# Patient Record
Sex: FEMALE | Race: WHITE | NOT HISPANIC OR LATINO | Employment: UNEMPLOYED | URBAN - NONMETROPOLITAN AREA
[De-identification: names, ages, dates, MRNs, and addresses within clinical notes are randomized per-mention and may not be internally consistent; named-entity substitution may affect disease eponyms.]

---

## 2017-07-12 ENCOUNTER — HOSPITAL ENCOUNTER (EMERGENCY)
Facility: HOSPITAL | Age: 22
Discharge: HOME | End: 2017-07-12
Attending: NURSE PRACTITIONER | Admitting: NURSE PRACTITIONER
Payer: COMMERCIAL

## 2017-07-12 VITALS
SYSTOLIC BLOOD PRESSURE: 120 MMHG | OXYGEN SATURATION: 100 % | RESPIRATION RATE: 16 BRPM | DIASTOLIC BLOOD PRESSURE: 74 MMHG | TEMPERATURE: 98.3 F | HEART RATE: 90 BPM

## 2017-07-12 DIAGNOSIS — R42 DIZZINESS: ICD-10-CM

## 2017-07-12 LAB
ALBUMIN SERPL-MCNC: 4.1 G/DL (ref 3.4–5)
ALBUMIN UR-MCNC: NEGATIVE MG/DL
ALP SERPL-CCNC: 69 U/L (ref 40–150)
ALT SERPL W P-5'-P-CCNC: 21 U/L (ref 0–50)
ANION GAP SERPL CALCULATED.3IONS-SCNC: 4 MMOL/L (ref 3–14)
APPEARANCE UR: CLEAR
AST SERPL W P-5'-P-CCNC: 12 U/L (ref 0–45)
BACTERIA #/AREA URNS HPF: ABNORMAL /HPF
BASOPHILS # BLD AUTO: 0.1 10E9/L (ref 0–0.2)
BASOPHILS NFR BLD AUTO: 1 %
BILIRUB SERPL-MCNC: 0.4 MG/DL (ref 0.2–1.3)
BILIRUB UR QL STRIP: NEGATIVE
BUN SERPL-MCNC: 14 MG/DL (ref 7–30)
CALCIUM SERPL-MCNC: 8.8 MG/DL (ref 8.5–10.1)
CHLORIDE SERPL-SCNC: 106 MMOL/L (ref 94–109)
CO2 SERPL-SCNC: 28 MMOL/L (ref 20–32)
COLOR UR AUTO: YELLOW
CREAT SERPL-MCNC: 0.57 MG/DL (ref 0.52–1.04)
DIFFERENTIAL METHOD BLD: NORMAL
EOSINOPHIL # BLD AUTO: 0.1 10E9/L (ref 0–0.7)
EOSINOPHIL NFR BLD AUTO: 1.2 %
ERYTHROCYTE [DISTWIDTH] IN BLOOD BY AUTOMATED COUNT: 12.6 % (ref 10–15)
GFR SERPL CREATININE-BSD FRML MDRD: NORMAL ML/MIN/1.7M2
GLUCOSE SERPL-MCNC: 95 MG/DL (ref 70–99)
GLUCOSE UR STRIP-MCNC: NEGATIVE MG/DL
HCG UR QL: NEGATIVE
HCT VFR BLD AUTO: 37.8 % (ref 35–47)
HGB BLD-MCNC: 13.3 G/DL (ref 11.7–15.7)
HGB UR QL STRIP: NEGATIVE
IMM GRANULOCYTES # BLD: 0 10E9/L (ref 0–0.4)
IMM GRANULOCYTES NFR BLD: 0.2 %
KETONES UR STRIP-MCNC: NEGATIVE MG/DL
LEUKOCYTE ESTERASE UR QL STRIP: ABNORMAL
LYMPHOCYTES # BLD AUTO: 1.2 10E9/L (ref 0.8–5.3)
LYMPHOCYTES NFR BLD AUTO: 24.6 %
MCH RBC QN AUTO: 30.7 PG (ref 26.5–33)
MCHC RBC AUTO-ENTMCNC: 35.2 G/DL (ref 31.5–36.5)
MCV RBC AUTO: 87 FL (ref 78–100)
MONOCYTES # BLD AUTO: 0.6 10E9/L (ref 0–1.3)
MONOCYTES NFR BLD AUTO: 12.2 %
NEUTROPHILS # BLD AUTO: 2.9 10E9/L (ref 1.6–8.3)
NEUTROPHILS NFR BLD AUTO: 60.8 %
NITRATE UR QL: NEGATIVE
NRBC # BLD AUTO: 0 10*3/UL
NRBC BLD AUTO-RTO: 0 /100
PH UR STRIP: 7 PH (ref 4.7–8)
PLATELET # BLD AUTO: 209 10E9/L (ref 150–450)
POTASSIUM SERPL-SCNC: 3.9 MMOL/L (ref 3.4–5.3)
PROT SERPL-MCNC: 7.7 G/DL (ref 6.8–8.8)
RBC # BLD AUTO: 4.33 10E12/L (ref 3.8–5.2)
RBC #/AREA URNS AUTO: 0 /HPF (ref 0–2)
SODIUM SERPL-SCNC: 138 MMOL/L (ref 133–144)
SP GR UR STRIP: 1.01 (ref 1–1.03)
SQUAMOUS #/AREA URNS AUTO: 7 /HPF (ref 0–1)
URN SPEC COLLECT METH UR: ABNORMAL
UROBILINOGEN UR STRIP-MCNC: NORMAL MG/DL (ref 0–2)
WBC # BLD AUTO: 4.8 10E9/L (ref 4–11)
WBC #/AREA URNS AUTO: 1 /HPF (ref 0–2)

## 2017-07-12 PROCEDURE — 99214 OFFICE O/P EST MOD 30 MIN: CPT | Performed by: NURSE PRACTITIONER

## 2017-07-12 PROCEDURE — 99213 OFFICE O/P EST LOW 20 MIN: CPT

## 2017-07-12 PROCEDURE — 81025 URINE PREGNANCY TEST: CPT | Performed by: NURSE PRACTITIONER

## 2017-07-12 PROCEDURE — 81001 URINALYSIS AUTO W/SCOPE: CPT | Performed by: NURSE PRACTITIONER

## 2017-07-12 PROCEDURE — 36415 COLL VENOUS BLD VENIPUNCTURE: CPT | Performed by: NURSE PRACTITIONER

## 2017-07-12 PROCEDURE — 85025 COMPLETE CBC W/AUTO DIFF WBC: CPT | Performed by: NURSE PRACTITIONER

## 2017-07-12 PROCEDURE — 80053 COMPREHEN METABOLIC PANEL: CPT | Performed by: NURSE PRACTITIONER

## 2017-07-12 RX ORDER — ONDANSETRON 4 MG/1
4 TABLET, ORALLY DISINTEGRATING ORAL EVERY 8 HOURS PRN
Qty: 10 TABLET | Refills: 0 | Status: SHIPPED | OUTPATIENT
Start: 2017-07-12 | End: 2017-07-15

## 2017-07-12 RX ORDER — MECLIZINE HCL 12.5 MG 12.5 MG/1
12.5 TABLET ORAL 4 TIMES DAILY PRN
Qty: 12 TABLET | Refills: 0 | Status: SHIPPED | OUTPATIENT
Start: 2017-07-12 | End: 2017-07-15

## 2017-07-12 ASSESSMENT — ENCOUNTER SYMPTOMS
HEADACHES: 0
FATIGUE: 1
DIZZINESS: 1
MUSCULOSKELETAL NEGATIVE: 1
APPETITE CHANGE: 0
NAUSEA: 1
COUGH: 0
LIGHT-HEADEDNESS: 0
FEVER: 0
ABDOMINAL PAIN: 0
WEAKNESS: 0

## 2017-07-12 NOTE — DISCHARGE INSTRUCTIONS
You're lab work was all normal.   No indication of infection, no electrolyte imbalances, no UTI, no pregnancy was found.     See Up-to-date hand out.   Rest and push fluids.   Use medications as needed.   Follow up with PCP if not improving in 2-3 days.

## 2017-07-12 NOTE — ED PROVIDER NOTES
"  History     Chief Complaint   Patient presents with     Dizziness     started having dizziness and unbalanced feeling starting during the night.     The history is provided by the patient. No  was used.     Luisa Varela is a 22 year old female who presents with dizziness that started this morning. Feels like she can fall over if she moves too fast. No cough, runny nose, fever. Had nausea initially, no vomiting. Felt a bit better after eating, then symptoms returned a short time later. At rest she is fine, movement make symptoms worse. Reports her ears were \"goopy\" this morning. No urinary symptoms, no diarrhea/constipation. No injury. No vision or hearing changes. Does feel more fatigued but this is normal for her.  Reports her symptoms as a woozy feeling, no room spinning.     I have reviewed the Medications, Allergies, Past Medical and Surgical History, and Social History in the Epic system.    Allergies:   Allergies   Allergen Reactions     No Clinical Screening - See Comments Itching     bleach         No current facility-administered medications on file prior to encounter.   No current outpatient prescriptions on file prior to encounter.    There is no problem list on file for this patient.      Past Surgical History:   Procedure Laterality Date     wisdom teeth extracted         Social History   Substance Use Topics     Smoking status: Former Smoker     Packs/day: 0.30     Years: 0.60     Types: Cigarettes     Smokeless tobacco: Not on file      Comment: quit in 2011 - no passive smoke exposure     Alcohol use No         There is no immunization history on file for this patient.    BMI: There is no height or weight on file to calculate BMI.      Review of Systems   Constitutional: Positive for fatigue. Negative for appetite change and fever.   HENT: Negative.    Respiratory: Negative for cough.    Gastrointestinal: Positive for nausea. Negative for abdominal pain.   Genitourinary: " Negative.    Musculoskeletal: Negative.    Neurological: Positive for dizziness. Negative for weakness, light-headedness and headaches.       Physical Exam   BP: 120/74  Pulse: 90  Temp: 98.3  F (36.8  C)  Resp: 16  SpO2: 100 %  Physical Exam   Constitutional: She is oriented to person, place, and time. Vital signs are normal. She appears well-developed and well-nourished. She is active and cooperative. She does not appear ill. No distress.   HENT:   Head: Normocephalic and atraumatic.   Right Ear: Tympanic membrane and external ear normal.   Left Ear: Tympanic membrane and external ear normal.   Nose: Nose normal.   Mouth/Throat: Uvula is midline, oropharynx is clear and moist and mucous membranes are normal. No oropharyngeal exudate.   Eyes: Conjunctivae, EOM and lids are normal. Pupils are equal, round, and reactive to light. No scleral icterus.   Neck: Normal range of motion. Neck supple. No rigidity. No edema, no erythema and normal range of motion present.   Cardiovascular: Normal rate, regular rhythm and normal heart sounds.    No murmur heard.  Pulmonary/Chest: Effort normal and breath sounds normal. No respiratory distress. She has no wheezes.   Abdominal: Soft. Normal appearance and bowel sounds are normal. She exhibits no distension. There is no hepatosplenomegaly. There is no tenderness. There is no CVA tenderness.   Musculoskeletal: Normal range of motion.   Lymphadenopathy:        Head (right side): No submental, no submandibular, no tonsillar, no preauricular, no posterior auricular and no occipital adenopathy present.        Head (left side): No submental, no submandibular, no tonsillar, no preauricular, no posterior auricular and no occipital adenopathy present.     She has no cervical adenopathy.   Neurological: She is alert and oriented to person, place, and time. She displays no atrophy and no tremor. No cranial nerve deficit or sensory deficit. She exhibits normal muscle tone. She displays no  seizure activity. Coordination and gait normal. GCS eye subscore is 4. GCS verbal subscore is 5. GCS motor subscore is 6.   Skin: Skin is warm, dry and intact. No rash noted. She is not diaphoretic.   Psychiatric: She has a normal mood and affect. Her speech is normal and behavior is normal. Judgment and thought content normal. Cognition and memory are normal.   Nursing note and vitals reviewed.      ED Course     ED Course     Procedures          Labs Ordered and Resulted from Time of ED Arrival Up to the Time of Departure from the ED   ROUTINE UA WITH MICROSCOPIC REFLEX TO CULTURE - Abnormal; Notable for the following:        Result Value    Leukocyte Esterase Urine Small (*)     Bacteria Urine None (*)     Squamous Epithelial /HPF Urine 7 (*)     All other components within normal limits   COMPREHENSIVE METABOLIC PANEL   CBC WITH PLATELETS DIFFERENTIAL   HCG QUALITATIVE URINE       Assessments & Plan (with Medical Decision Making)     I have reviewed the nursing notes.    I have reviewed the findings, diagnosis, plan and need for follow up with the patient.  Advised patient:   You're lab work was all normal.   No indication of infection, no electrolyte imbalances, no UTI, no pregnancy was found.   See Up-to-date hand out.   Rest and push fluids.   Use medications as needed.   Follow up with PCP if not improving in 2-3 days.     Discharge Medication List as of 7/12/2017 11:58 AM      START taking these medications    Details   ondansetron (ZOFRAN ODT) 4 MG ODT tab Take 1 tablet (4 mg) by mouth every 8 hours as needed for nausea, Disp-10 tablet, R-0, E-Prescribe      meclizine (ANTIVERT) 12.5 MG tablet Take 1 tablet (12.5 mg) by mouth 4 times daily as needed for dizziness, Disp-12 tablet, R-0, E-Prescribe             Final diagnoses:   Dizziness       7/12/2017   [facility]     Angelica Castaneda NP  07/12/17 7022

## 2017-07-12 NOTE — ED AVS SNAPSHOT
[facility]: [phone]                                              INTERAGENCY TRANSFER FORM - PHYSICIAN ORDERS   2017                    Hospital Admission Date: 2017  SONIA RIVAS   : 1995  Sex: Female        Attending Provider: Angelica Castaneda NP     Allergies:  No Clinical Screening - See Comments    Infection:  None   Service:  URGENT CARE    Ht:  --   Wt:  --   Admission Wt:  --    BMI:  --   BSA:  --            Patient PCP Information     Provider PCP Type    Alondra Almanza MD General      ED Clinical Impression     Diagnosis Description Comment Added By Time Added    Dizziness [R42] Dizziness [R42]  Angelica Castaneda NP 2017 11:55 AM      Hospital Problems as of 2017     None      Non-Hospital Problems as of 2017     None      Code Status History     This patient does not have a recorded code status. Please follow your organizational policy for patients in this situation.         Medication Review      UNREVIEWED medications. Ask your doctor about these medications        Dose / Directions Comments    IBUPROFEN PO        Dose:  400 mg   Take 400 mg by mouth every 6 hours as needed for moderate pain   Refills:  0          START taking        Dose / Directions Comments    meclizine 12.5 MG tablet   Commonly known as:  ANTIVERT        Dose:  12.5 mg   Take 1 tablet (12.5 mg) by mouth 4 times daily as needed for dizziness   Quantity:  12 tablet   Refills:  0        ondansetron 4 MG ODT tab   Commonly known as:  ZOFRAN ODT        Dose:  4 mg   Take 1 tablet (4 mg) by mouth every 8 hours as needed for nausea   Quantity:  10 tablet   Refills:  0                  Further instructions from your care team       You're lab work was all normal.   No indication of infection, no electrolyte imbalances, no UTI, no pregnancy was found.     See Up-to-date hand out.   Rest and push fluids.   Use medications as needed.   Follow up with PCP if not improving in  2-3 days.

## 2017-07-12 NOTE — ED NOTES
During the night pt woke up, noted feeling  dizzy. This am felt dizzy she was unable to stand up for a couple seconds.. Ate breakfast with a little improvement in sx. Denies fever, room did not spin. Denies vomiting and diarrhea.

## 2017-07-12 NOTE — ED AVS SNAPSHOT
[facility]    [address]    [place] [address]    Phone:  [phone]                                       Luisa Varela   MRN: [id number]    Department:  [facility]   Date of Visit:  7/12/2017           Patient Information     Date Of Birth          1995        Your diagnoses for this visit were:     Dizziness        You were seen by Angelica Castaneda NP.      Follow-up Information     Follow up with Alondra Almanza MD In 3 days.    Specialty:  OB/Gyn    Why:  if not improving    Contact information:    [facility]  [address] [place] [id number] [phone]          Discharge Instructions       You're lab work was all normal.   No indication of infection, no electrolyte imbalances, no UTI, no pregnancy was found.     See Up-to-date hand out.   Rest and push fluids.   Use medications as needed.   Follow up with PCP if not improving in 2-3 days.              Review of your medicines      START taking        Dose / Directions Last dose taken    meclizine 12.5 MG tablet   Commonly known as:  ANTIVERT   Dose:  12.5 mg   Quantity:  12 tablet        Take 1 tablet (12.5 mg) by mouth 4 times daily as needed for dizziness   Refills:  0        ondansetron 4 MG ODT tab   Commonly known as:  ZOFRAN ODT   Dose:  4 mg   Quantity:  10 tablet        Take 1 tablet (4 mg) by mouth every 8 hours as needed for nausea   Refills:  0          Our records show that you are taking the medicines listed below. If these are incorrect, please call your family doctor or clinic.        Dose / Directions Last dose taken    IBUPROFEN PO   Dose:  400 mg        Take 400 mg by mouth every 6 hours as needed for moderate pain   Refills:  0                Prescriptions were sent or printed at these locations (2 Prescriptions)                   [facility] [address] [place] [address] [address]    Telephone:  [phone]   Fax:   "[phone]   Hours:                  E-Prescribed (2 of 2)         ondansetron (ZOFRAN ODT) 4 MG ODT tab               meclizine (ANTIVERT) 12.5 MG tablet                Procedures and tests performed during your visit     CBC with platelets differential    Comprehensive metabolic panel    HCG qualitative urine    UA with Microscopic reflex to Culture      Orders Needing Specimen Collection     None      Pending Results     No orders found from 7/10/2017 to 2017.            Pending Culture Results     No orders found from 7/10/2017 to 2017.            Thank you for choosing [facility]       Thank you for choosing [facility] for your care. Our goal is always to provide you with excellent care. Hearing back from our patients is one way we can continue to improve our services. Please take a few minutes to complete the written survey that you may receive in the mail after you visit with us. Thank you!        [employer] Information     "[employer] lets you send messages to your doctor, view your test results, renew your prescriptions, schedule appointments and more. To sign up, go to [email or web address] . Click on \"Log in\" on the left side of the screen, which will take you to the Welcome page. Then click on \"Sign up Now\" on the right side of the page.     You will be asked to enter the access code listed below, as well as some personal information. Please follow the directions to create your username and password.     Your access code is: [email or web address]  Expires: 10/10/2017 11:57 AM     Your access code will  in 90 days. If you need help or a new code, please call your [facility] clinic or [phone].        Care EveryWhere ID     This is your Care EveryWhere ID. This could be used by other organizations to access your [facility] medical records  [id number]        Equal Access to Services     LISBETH SANCHEZ AH: Melanie Cohen, kathrine contreras, kayode cao " abby wilson ah. So [facility] [phone].    ATENCIÓN: Si habla español, tiene a villalobos disposición servicios gratuitos de asistencia lingüística. Llame al [phone].    We comply with applicable federal civil rights laws and [place] laws. We do not discriminate on the basis of race, color, national origin, age, disability sex, sexual orientation or gender identity.            After Visit Summary       This is your record. Keep this with you and show to your community pharmacist(s) and doctor(s) at your next visit.

## 2017-07-12 NOTE — ED NOTES
Patient presents with dizziness started at 5 AM this morning.  NKI .  Patient also stated drinking more water than normal.

## 2017-07-12 NOTE — ED AVS SNAPSHOT
[facility]    [address] [address]    Phone:  [phone]                                       Luisa Varela   MRN: [id number]    Department:  [facility]   Date of Visit:  7/12/2017           After Visit Summary Signature Page     I have received my discharge instructions, and my questions have been answered. I have discussed any challenges I see with this plan with the nurse or doctor.    ..........................................................................................................................................  Patient/Patient Representative Signature      ..........................................................................................................................................  Patient Representative Print Name and Relationship to Patient    ..................................................               ................................................  Date                                            Time    ..........................................................................................................................................  Reviewed by Signature/Title    ...................................................              ..............................................  Date                                                            Time

## 2023-05-27 ENCOUNTER — OFFICE VISIT (OUTPATIENT)
Dept: FAMILY MEDICINE | Facility: OTHER | Age: 28
End: 2023-05-27
Payer: COMMERCIAL

## 2023-05-27 VITALS
SYSTOLIC BLOOD PRESSURE: 138 MMHG | HEART RATE: 75 BPM | TEMPERATURE: 98.3 F | RESPIRATION RATE: 16 BRPM | HEIGHT: 63 IN | BODY MASS INDEX: 31.57 KG/M2 | DIASTOLIC BLOOD PRESSURE: 86 MMHG | WEIGHT: 178.2 LBS | OXYGEN SATURATION: 97 %

## 2023-05-27 DIAGNOSIS — H10.31 ACUTE BACTERIAL CONJUNCTIVITIS OF RIGHT EYE: ICD-10-CM

## 2023-05-27 DIAGNOSIS — J01.00 ACUTE NON-RECURRENT MAXILLARY SINUSITIS: Primary | ICD-10-CM

## 2023-05-27 PROCEDURE — 99203 OFFICE O/P NEW LOW 30 MIN: CPT

## 2023-05-27 RX ORDER — CETIRIZINE HYDROCHLORIDE 10 MG/1
10 TABLET ORAL DAILY
COMMUNITY

## 2023-05-27 RX ORDER — POLYMYXIN B SULFATE AND TRIMETHOPRIM 1; 10000 MG/ML; [USP'U]/ML
SOLUTION OPHTHALMIC
Qty: 10 ML | Refills: 0 | Status: SHIPPED | OUTPATIENT
Start: 2023-05-27 | End: 2023-06-03

## 2023-05-27 RX ORDER — DIPHENHYDRAMINE HCL 12.5 MG/5ML
SOLUTION ORAL 4 TIMES DAILY PRN
COMMUNITY

## 2023-05-27 RX ORDER — DOXYCYCLINE 100 MG/1
100 CAPSULE ORAL 2 TIMES DAILY
Qty: 14 CAPSULE | Refills: 0 | Status: SHIPPED | OUTPATIENT
Start: 2023-05-27 | End: 2023-06-03

## 2023-05-27 RX ORDER — SEMAGLUTIDE 0.25 MG/.5ML
INJECTION, SOLUTION SUBCUTANEOUS
COMMUNITY
Start: 2023-03-06

## 2023-05-27 ASSESSMENT — PAIN SCALES - GENERAL: PAINLEVEL: MILD PAIN (2)

## 2023-05-27 NOTE — PATIENT INSTRUCTIONS
Please refer to your AVS for follow up and pain/symptoms management recommendations (I.e.: medications, helpful conservative treatment modalities, appropriate follow up if need to a specialist or family practice, etc.). Please return to urgent care if your symptoms change or worsen.     Discharge instructions:  -If you were prescribed a medication(s), please take this as prescribed/directed  -Monitor your symptoms, if changing/worsening, return to UC/ER or PCP for follow up    Sinus Infection:   1. Dry out congestion with flonase (1spray in both nostrils 2x daily for 3-5 days) and pseudoephedrine (1-2 tabs every 4-6 hrs for 3-5 days) unless contraindicated     2. Antihistamine such as Claritin or Zyrtec, etc. Generic brands are OK as well.     3. Use a saline spray/Neti Pot/sinus flush (Jerald Med Sinus Rinse) 2-3 times daily to irrigate sinuses/mucosal tissue. This dilutes and moves secretions.     4. Tylenol or ibuprofen for pain and fevers - alternate every 4 hours as needed. I.e.: Ibuprofen at 8am, Tylenol 12pm, Ibuprofen 4pm    -Daily maximum of Tylenol is 4000mg (recommend staying under 3000mg)   -Daily maximum of Ibuprofen is 3200 mg    5. Plenty of fluids and rest as needed.     6. Chew, yawn and speak to help eustachian tubes drain.     * If you are a smoker, try to quit *     - Consider the following over-the-counter products if you are older than 1 year and not pregnant: honey/chestal for cough relief and sambucus/elderberry for viral upper-respiratory symptoms.    Please refer to your AVS for follow up and pain/symptoms management recommendations (I.e.: medications, helpful conservative treatment modalities, appropriate follow up if need to a specialist or family practice, etc.). Please return to urgent care if your symptoms change or worsen.     Discharge instructions:  -If you were prescribed a medication(s), please take this as prescribed/directed  -Monitor your symptoms, if changing/worsening, return  to UC/ER or PCP for follow up     Bacterial Conjunctivitis  -If placed antibiotic - please use as directed (wash hands before putting in eye).   -Avoid touching the eye, as conjunctivitis/pink eye, is very contagious.   -Wash your hands regularly before touching your eye, if you must touch it. Wash your pillow case daily or every other day until symptoms clear up.   -Do not share cosmetics, eye drops or contacts with other individuals.   -Warm compresses are recommended as needed.   -For pain control (if needed), if you are able to take Ibuprofen and Tylenol, we recommend alternating these (see note below). Do not wear a patch over your eye (unless directed to do so).    -Alternate every 4 hours as needed. I.e.: Ibuprofen at 8am, Tylenol 12pm, Ibuprofen 4pm    -Daily maximum of Tylenol is 4000mg (recommend staying under 3000mg)   -Daily maximum of Ibuprofen is 1200mg (take no more than six 200mg pills a day)

## 2023-05-27 NOTE — PROGRESS NOTES
ASSESSMENT/PLAN:    I have reviewed the nursing notes.  I have reviewed the findings, diagnosis, plan and need for follow up with the patient.    1. Acute non-recurrent maxillary sinusitis  - doxycycline hyclate (VIBRAMYCIN) 100 MG capsule; Take 1 capsule (100 mg) by mouth 2 times daily for 7 days  Dispense: 14 capsule; Refill: 0    Physical exam and symptoms consistent with maxillary sinusitis.  Will treat patient with doxycycline twice a day for 7 days as patient has an allergy to Augmentin.  Advised patient not to eat or drink any dairy within an hour or 2 of taking the medication and not to take any multivitamins, calcium, or iron while on the doxycycline as it can interfere with absorption.  Advised patient that the doxycycline can increase her sensitivity to the sun.  Advised that she can take Tylenol and ibuprofen as needed.  Discussed that she should continue taking her antihistamine and may also try saline nasal rinses and fluticasone nasal spray.    2. Acute bacterial conjunctivitis of right eye  - trimethoprim-polymyxin b (POLYTRIM) 08306-2.1 UNIT/ML-% ophthalmic solution; 1 drop in affected eye(s) every 3 hrs while awake for 5-7 days until resolved - max 6 doses per day  Dispense: 10 mL; Refill: 0    Physical exam and symptoms also consistent with acute bacterial conjunctivitis of the right eye.  Will treat with Polytrim ophthalmic solution.  Advised patient that she is considered contagious until 24 hours after starting antibiotic eyedrops.  Advised patient to avoid touching her eye and that she should follow good hand hygiene practices.  Advised patient that she can use Tylenol and ibuprofen as needed along with warm compresses.  Discussed that she should wash her pillowcase and bedding.    Discussed warning signs/symptoms indicative of need to f/u    Follow up if symptoms persist or worsen or concerns    I explained my diagnostic considerations and recommendations to the patient, who voiced  understanding and agreement with the treatment plan. All questions were answered. We discussed potential side effects of any prescribed or recommended therapies, as well as expectations for response to treatments.    ODALYS Rondon CNP  5/27/2023  11:29 AM    HPI:    Luisa Varela is a 28 year old female  who presents to [facility] today for concerns of eye infection    right eye complaint/concern.     Eye Sx: discharge/drainage, mattering, swelling, pain, redness, Nasal congestion  Problem/Context: Possible pink eye exposure, recent sinus symptoms  History: eye symptoms started yesterday  Has been taking antihistamines for her sinus symptoms which started about 10-14 days ago with no improvement in symptoms    Contact or glasses use: No  Changes in vision: No  Change in eye ROM: No  Presence of Flashers/Floaters: No  Discharge description: green/yellow  Presence of URI Symptoms: YES  Eyelid (any symptoms): YES - mild swelling  Any dental or jaw pain: No  Any exposure to trauma or chemical burns to eye: No    Treatments Tried: None    Prior eye or sinus surgeries: No  Similar symptoms in the past: YES    PCP: Dr. Almanza      History reviewed. No pertinent past medical history.  Past Surgical History:   Procedure Laterality Date     wisdom teeth extracted       Social History     Tobacco Use     Smoking status: Former     Packs/day: 0.30     Years: 0.60     Pack years: 0.18     Types: Cigarettes     Smokeless tobacco: Not on file     Tobacco comments:     quit in 2011 - no passive smoke exposure   Vaping Use     Vaping status: Not on file   Substance Use Topics     Alcohol use: No     Current Outpatient Medications   Medication Sig Dispense Refill     cetirizine (ZYRTEC) 10 MG tablet Take 10 mg by mouth daily       diphenhydrAMINE (BENADRYL) 12.5 MG/5ML liquid Take by mouth 4 times daily as needed for allergies or sleep       IBUPROFEN PO Take 400 mg by mouth every 6 hours as needed for moderate pain        "WEGOVY 0.25 MG/0.5ML pen INJECT 0.5 ML SUBCUTANEOUSLY ONCE A WEEK AS DIRECTED       Allergies   Allergen Reactions     Augmentin [Amoxicillin-Pot Clavulanate] Nausea and Vomiting and Diarrhea     Other [No Clinical Screening - See Comments] Itching     bleach     Seasonal Allergies Hives and Itching     Per 1/16/12, allergy to bleach: hives.     Past medical history, past surgical history, current medications and allergies reviewed and accurate to the best of my knowledge.      ROS:  Refer to HPI    /86 (BP Location: Left arm, Patient Position: Sitting, Cuff Size: Adult Regular)   Pulse 75   Temp 98.3  F (36.8  C) (Tympanic)   Resp 16   Ht 1.6 m (5' 3\")   Wt 80.8 kg (178 lb 3.2 oz)   LMP 05/06/2023 (Approximate)   SpO2 97%   BMI 31.57 kg/m      EXAM:  General Appearance: Well appearing 28 year old female, appropriate appearance for age. No acute distress   Ears: Left TM intact, translucent with bony landmarks appreciated, no erythema, no effusion, no bulging, no purulence.  Right TM intact, translucent with bony landmarks appreciated, no erythema, no effusion, no bulging, no purulence.  Left auditory canal clear.  Right auditory canal clear.  Normal external ears, non tender.  Eyes: right conjunctiva with erythema and irritation, purulent drainage and crusting, no eyelid swelling, pupils equal   Oropharynx: moist mucous membranes, posterior pharynx without erythema, tonsils symmetric and 1+, no erythema, no exudates or petechiae, no post nasal drip seen, no trismus, voice clear.    Sinuses:  Mild sinus tenderness upon palpation of the maxillary sinuses  Nose:  Bilateral nares: no erythema, no edema, purulent drainage and congestion   Neck: supple without adenopathy  Respiratory: normal chest wall and respirations.  Normal effort.  Clear to auscultation bilaterally, no wheezing, crackles or rhonchi.  No increased work of breathing.  No cough appreciated.  Cardiac: RRR with no murmurs  Neuro: Alert and " oriented to person, place, and time. Psychological: normal affect, alert, oriented, and pleasant.

## 2023-05-27 NOTE — NURSING NOTE
Chief Complaint   Patient presents with     Eye Problem     Since last night - right eye   Patient tx with benadryl and zyrtec    Advanced Care Planning on file? no    Medication Review Completed: complete    FOOD SECURITY SCREENING QUESTIONS:    The next two questions are to help us understand your food security.  If you are feeling you need any assistance in this area, we have resources available to support you today.    Hunger Vital Signs:  Within the past 12 months we worried whether our food would run out before we got money to buy more. Never  Within the past 12 months the food we bought just didn't last and we didn't have money to get more. Never    Estefania West LPN

## 2024-04-22 ENCOUNTER — HOSPITAL ENCOUNTER (EMERGENCY)
Facility: HOSPITAL | Age: 29
Discharge: HOME | End: 2024-04-22
Payer: COMMERCIAL

## 2024-04-22 VITALS
SYSTOLIC BLOOD PRESSURE: 132 MMHG | WEIGHT: 129 LBS | OXYGEN SATURATION: 99 % | DIASTOLIC BLOOD PRESSURE: 83 MMHG | BODY MASS INDEX: 22.85 KG/M2 | TEMPERATURE: 99.4 F | HEART RATE: 88 BPM | RESPIRATION RATE: 18 BRPM

## 2024-04-22 DIAGNOSIS — J11.1 INFLUENZA-LIKE ILLNESS: ICD-10-CM

## 2024-04-22 LAB
FLUAV RNA SPEC QL NAA+PROBE: NEGATIVE
FLUBV RNA RESP QL NAA+PROBE: NEGATIVE
GROUP A STREP BY PCR: NOT DETECTED
RSV RNA SPEC NAA+PROBE: NEGATIVE
SARS-COV-2 RNA RESP QL NAA+PROBE: NEGATIVE

## 2024-04-22 PROCEDURE — 87651 STREP A DNA AMP PROBE: CPT

## 2024-04-22 PROCEDURE — G0463 HOSPITAL OUTPT CLINIC VISIT: HCPCS

## 2024-04-22 PROCEDURE — 87637 SARSCOV2&INF A&B&RSV AMP PRB: CPT

## 2024-04-22 PROCEDURE — 99213 OFFICE O/P EST LOW 20 MIN: CPT

## 2024-04-22 ASSESSMENT — ENCOUNTER SYMPTOMS
SORE THROAT: 0
VOMITING: 0
COUGH: 0
FEVER: 1
NAUSEA: 0
FATIGUE: 1
RHINORRHEA: 0
SINUS PRESSURE: 0
ACTIVITY CHANGE: 0
DIARRHEA: 0
SINUS PAIN: 0
CHILLS: 1
ABDOMINAL PAIN: 0
APPETITE CHANGE: 0
SHORTNESS OF BREATH: 0
DYSURIA: 0

## 2024-04-22 ASSESSMENT — ACTIVITIES OF DAILY LIVING (ADL): ADLS_ACUITY_SCORE: 35

## 2024-04-22 NOTE — Clinical Note
Luisa Varela was seen and treated in our emergency department on 4/22/2024.  She may return to work on 04/25/2024.       If you have any questions or concerns, please don't hesitate to call.      Gabriela Cunningham, NP

## 2024-04-23 NOTE — ED PROVIDER NOTES
History     Chief Complaint   Patient presents with    Fever     HPI  Luisa Varela is a 29 year old female who presents to the urgent care with complaints of right lateral neck pain, fevers (tmax 102), and right sided ear pain that started today. She denies sore throat, cough, dental pain, facial swelling, swallowing difficulty, difficulty opening mouth, abd pain, chest pain, n/v/d, and shortness of breath. Non smoker. Recently traveled home from [place]. Denies ill contacts. Does not have influenza vaccine. No recent abx. Ibuprofen PTA.     Allergies:  Allergies   Allergen Reactions    Augmentin [Amoxicillin-Pot Clavulanate] Nausea and Vomiting and Diarrhea    Other [No Clinical Screening - See Comments] Itching     bleach    Seasonal Allergies Hives and Itching     Per 1/16/12, allergy to bleach: hives.       Problem List:    There are no problems to display for this patient.       Past Medical History:    No past medical history on file.    Past Surgical History:    Past Surgical History:   Procedure Laterality Date    wisdom teeth extracted         Family History:    Family History   Problem Relation Age of Onset    Allergies Other         family h/o    Breast Cancer Other         family h/o    Depression Other         family h/o    Diabetes Other         family h/o    Heart Disease Other         heart disease - family h/o    Hypertension Mother     Hypertension Father     Thyroid Disease Mother         thyroid disease       Social History:  Marital Status:   [2]  Social History     Tobacco Use    Smoking status: Former     Current packs/day: 0.30     Average packs/day: 0.3 packs/day for 0.6 years (0.2 ttl pk-yrs)     Types: Cigarettes    Tobacco comments:     quit in 2011 - no passive smoke exposure   Substance Use Topics    Alcohol use: No        Medications:    cetirizine (ZYRTEC) 10 MG tablet  diphenhydrAMINE (BENADRYL) 12.5 MG/5ML liquid  IBUPROFEN PO  WEGOVY 0.25 MG/0.5ML pen          Review of  Systems   Constitutional:  Positive for chills, fatigue and fever. Negative for activity change and appetite change.   HENT:  Positive for ear pain. Negative for congestion, rhinorrhea, sinus pressure, sinus pain and sore throat.    Respiratory:  Negative for cough and shortness of breath.    Gastrointestinal:  Negative for abdominal pain, diarrhea, nausea and vomiting.   Genitourinary:  Negative for dysuria.   All other systems reviewed and are negative.      Physical Exam   BP: 132/83  Pulse: 88  Temp: 99.4  F (37.4  C)  Resp: 18  Weight: 58.5 kg (129 lb)  SpO2: 99 %      Physical Exam  Vitals and nursing note reviewed.   Constitutional:       General: She is not in acute distress.     Appearance: Normal appearance. She is ill-appearing. She is not toxic-appearing or diaphoretic.   HENT:      Head:      Jaw: No trismus.      Nose: No congestion or rhinorrhea.      Mouth/Throat:      Lips: Pink. No lesions.      Mouth: Mucous membranes are moist.      Dentition: Abnormal dentition. Dental caries present. No dental tenderness, gingival swelling, dental abscesses or gum lesions.      Tongue: No lesions.      Pharynx: Oropharynx is clear. No oropharyngeal exudate or posterior oropharyngeal erythema.     Cardiovascular:      Rate and Rhythm: Normal rate and regular rhythm.      Pulses: Normal pulses.      Heart sounds: Normal heart sounds.   Pulmonary:      Effort: Pulmonary effort is normal.      Breath sounds: Normal breath sounds. No wheezing, rhonchi or rales.   Lymphadenopathy:      Cervical: Cervical adenopathy present.   Neurological:      Mental Status: She is alert.         ED Course        Procedures           No results found for this or any previous visit (from the past 24 hour(s)).    Medications - No data to display    Assessments & Plan (with Medical Decision Making)     I have reviewed the nursing notes.    I have reviewed the findings, diagnosis, plan and need for follow up with the patient.  Luisa MATIAS  Avery is a 29 year old female who presents to the urgent care with complaints of right lateral neck pain, fevers (tmax 102), and right sided ear pain that started today. She denies sore throat, cough, dental pain, facial swelling, swallowing difficulty, difficulty opening mouth, abd pain, chest pain, n/v/d, and shortness of breath. Non smoker. Recently traveled home from [place]. Denies ill contacts. Does not have influenza vaccine. No recent abx. Ibuprofen PTA.     MDM: differentials include, but not limited to, strep, viral illness, otitis media, and dental infection. Vital signs normal. Temp 99.4. non toxic in appearance with no noted distress. Lungs clear, heart tones regular. Decay with breakdown to teeth #29 and #30. She denies dental pain. There is no facial swelling. Has had dental infections in the past, this does not feel similar. No trismus or drooling. Uvula midline,. No erythema to posterior oropharynx. Given the sudden onset and symptoms just starting today after traveling and bilateral cervical lymphadenopathy, most likely viral in etiology. Strep and covid multiplex pending. Supportive measures and strict return precautions discussed. She is in agreement with plan.     (J11.1) Influenza-like illness  Plan: We will call with results. Push fluids.    You can take 650-1000mg of tylenol every 6 hours as needed, max of 3000mg in 24 hours and 600-800mg of ibuprofen every 8 hours as needed, max of 2400mg in 24 hours.     Return with any dental pain, facial swelling, uncontrolled fevers, or other concerns. Understanding verbalized.       New Prescriptions    No medications on file       Final diagnoses:   Influenza-like illness       4/22/2024   [facility]       Gabriela Cunningham NP  04/22/24 2025

## 2024-04-23 NOTE — DISCHARGE INSTRUCTIONS
We will call with results. Push fluids.    You can take 650-1000mg of tylenol every 6 hours as needed, max of 3000mg in 24 hours and 600-800mg of ibuprofen every 8 hours as needed, max of 2400mg in 24 hours.     Return with any dental pain, facial swelling, uncontrolled fevers, or other concerns.

## 2024-04-23 NOTE — ED TRIAGE NOTES
Pt presents with c/o having right sided neck pain accompanied with headaches   Pt states increased fluid in the ears as well   S/x started today   Pt reports has had increased temps as high as 102 today   Pt reports did just come back from [place]   Pt states had ibu before coming in